# Patient Record
Sex: FEMALE | Race: WHITE | NOT HISPANIC OR LATINO | URBAN - NONMETROPOLITAN AREA
[De-identification: names, ages, dates, MRNs, and addresses within clinical notes are randomized per-mention and may not be internally consistent; named-entity substitution may affect disease eponyms.]

---

## 2018-04-18 ENCOUNTER — APPOINTMENT (RX ONLY)
Dept: URBAN - NONMETROPOLITAN AREA CLINIC 13 | Facility: CLINIC | Age: 53
Setting detail: DERMATOLOGY
End: 2018-04-18

## 2018-04-18 DIAGNOSIS — Z41.9 ENCOUNTER FOR PROCEDURE FOR PURPOSES OTHER THAN REMEDYING HEALTH STATE, UNSPECIFIED: ICD-10-CM

## 2018-04-18 PROCEDURE — ? WAXING

## 2018-06-28 ENCOUNTER — APPOINTMENT (RX ONLY)
Dept: URBAN - NONMETROPOLITAN AREA CLINIC 13 | Facility: CLINIC | Age: 53
Setting detail: DERMATOLOGY
End: 2018-06-28

## 2018-06-28 DIAGNOSIS — Z41.9 ENCOUNTER FOR PROCEDURE FOR PURPOSES OTHER THAN REMEDYING HEALTH STATE, UNSPECIFIED: ICD-10-CM

## 2018-06-28 PROCEDURE — ? BOTOX

## 2018-06-28 PROCEDURE — ? JUVEDERM ULTRA INJECTION

## 2018-06-28 NOTE — PROCEDURE: BOTOX
Lateral Platysmal Bands Units: 0
Lot #: [id number]
Additional Area 1 Location: Total units
Additional Area 4 Location: professional sample
Depressor Anguli Oris Units: 2
Forehead Units: 5
Additional Area 6 Location: professional samples
Additional Area 3 Location: chin
Consent: Written consent obtained. Risks include but not limited to lid/brow ptosis, bruising, swelling, diplopia, temporary effect, incomplete chemical denervation.
Glabellar Complex Units: 20
Detail Level: Detailed
Expiration Date (Month Year): 12/20
Dilution (U/0.1 Cc): 1
Additional Area 2 Location: [facility]
Price (Use Numbers Only, No Special Characters Or $): 0.00
Post-Care Instructions: Patient instructed to limit physical activity for 24 hours. Patient instructed not to rub or massage areas injected for 24 hours.

## 2018-06-28 NOTE — PROCEDURE: JUVEDERM ULTRA INJECTION
Marionette Lines Filler Volume In Cc: 0.3
Additional Area 5 Volume In Cc: 0
Topical Anesthesia?: BLT gel (benzocaine 20%, lidocaine 6%, tetracaine 4%)
Map Statement: See Attach Map for Complete Details
Lot #: [id number]
Detail Level: Detailed
Anesthesia Volume In Cc: 0.5
Vermilion Lips Filler Volume In Cc: 0.7
Use Map Statement For Sites (Optional): No
Filler: Juvederm Ultra
Anesthesia Type: 1% lidocaine with epinephrine
Post-Care Instructions: Patient instructed to apply ice to reduce swelling.
Consent: Written consent obtained. Risks include but not limited to bruising, beading, irregular texture, ulceration, infection, allergic reaction, scar formation, incomplete augmentation, temporary nature, procedural pain.
Expiration Date (Month Year): 2/19
Procedural Text: The filler was administered to the treatment areas noted above.
Additional Anesthesia Volume In Cc: 6
Price (Use Numbers Only, No Special Characters Or $): 161

## 2018-08-02 ENCOUNTER — APPOINTMENT (RX ONLY)
Dept: URBAN - NONMETROPOLITAN AREA CLINIC 13 | Facility: CLINIC | Age: 53
Setting detail: DERMATOLOGY
End: 2018-08-02

## 2018-08-02 DIAGNOSIS — Z41.9 ENCOUNTER FOR PROCEDURE FOR PURPOSES OTHER THAN REMEDYING HEALTH STATE, UNSPECIFIED: ICD-10-CM

## 2018-08-02 PROCEDURE — ? ADDITIONAL NOTES

## 2018-08-02 PROCEDURE — ? BOTOX

## 2018-08-02 ASSESSMENT — LOCATION SIMPLE DESCRIPTION DERM
LOCATION SIMPLE: RIGHT NOSE
LOCATION SIMPLE: LEFT CHEEK

## 2018-08-02 ASSESSMENT — LOCATION DETAILED DESCRIPTION DERM
LOCATION DETAILED: LEFT SUPERIOR NASAL CHEEK
LOCATION DETAILED: RIGHT NASAL SIDEWALL

## 2018-08-02 ASSESSMENT — LOCATION ZONE DERM
LOCATION ZONE: NOSE
LOCATION ZONE: FACE

## 2018-08-02 NOTE — PROCEDURE: BOTOX
Lot #: [id number]
Additional Area 6 Units: 0
Additional Area 2 Location: [facility]
Additional Area 1 Location: Total units
Additional Area 4 Location: professional sample
Post-Care Instructions: Patient instructed to limit physical activity for 24 hours. Patient instructed not to rub or massage areas injected for 24 hours.
Dilution (U/0.1 Cc): 1
Consent: Written consent obtained. Risks include but not limited to lid/brow ptosis, bruising, swelling, diplopia, temporary effect, incomplete chemical denervation.
Expiration Date (Month Year): 12/20
Additional Area 6 Location: professional samples
Detail Level: Detailed
Additional Area 4 Units: 2
Price (Use Numbers Only, No Special Characters Or $): 0.00
Additional Area 3 Location: chin

## 2018-08-15 ENCOUNTER — APPOINTMENT (RX ONLY)
Dept: URBAN - NONMETROPOLITAN AREA CLINIC 13 | Facility: CLINIC | Age: 53
Setting detail: DERMATOLOGY
End: 2018-08-15

## 2018-08-15 DIAGNOSIS — Z41.9 ENCOUNTER FOR PROCEDURE FOR PURPOSES OTHER THAN REMEDYING HEALTH STATE, UNSPECIFIED: ICD-10-CM

## 2018-08-15 PROCEDURE — ? WAXING

## 2018-08-15 NOTE — PROCEDURE: WAXING
Detail Level: Zone
Post-Care Instructions: I reviewed with the patient in detail post-care instructions. Patient should avoid sun exposure and wear sun protection.

## 2018-10-19 ENCOUNTER — APPOINTMENT (RX ONLY)
Dept: URBAN - NONMETROPOLITAN AREA CLINIC 13 | Facility: CLINIC | Age: 53
Setting detail: DERMATOLOGY
End: 2018-10-19

## 2018-10-19 DIAGNOSIS — Z41.9 ENCOUNTER FOR PROCEDURE FOR PURPOSES OTHER THAN REMEDYING HEALTH STATE, UNSPECIFIED: ICD-10-CM

## 2018-10-19 PROCEDURE — ? WAXING

## 2019-01-12 ENCOUNTER — APPOINTMENT (RX ONLY)
Dept: URBAN - NONMETROPOLITAN AREA CLINIC 13 | Facility: CLINIC | Age: 54
Setting detail: DERMATOLOGY
End: 2019-01-12

## 2019-01-12 DIAGNOSIS — Z41.9 ENCOUNTER FOR PROCEDURE FOR PURPOSES OTHER THAN REMEDYING HEALTH STATE, UNSPECIFIED: ICD-10-CM

## 2019-01-12 PROCEDURE — ? WAXING

## 2019-01-12 NOTE — PROCEDURE: WAXING
Price (Use Numbers Only, No Special Characters Or $): 0.00
Post-Care Instructions: I reviewed with the patient in detail post-care instructions. Patient should avoid sun exposure and wear sun protection.
Detail Level: Zone

## 2019-04-17 ENCOUNTER — APPOINTMENT (RX ONLY)
Dept: URBAN - NONMETROPOLITAN AREA CLINIC 13 | Facility: CLINIC | Age: 54
Setting detail: DERMATOLOGY
End: 2019-04-17

## 2019-04-17 DIAGNOSIS — Z41.9 ENCOUNTER FOR PROCEDURE FOR PURPOSES OTHER THAN REMEDYING HEALTH STATE, UNSPECIFIED: ICD-10-CM

## 2019-04-17 PROCEDURE — ? RESTYLANE REFYNE INJECTION

## 2019-04-17 PROCEDURE — ? BOTOX

## 2019-04-17 NOTE — PROCEDURE: RESTYLANE REFYNE INJECTION
Additional Area 2 Location: perioral
Price (Use Numbers Only, No Special Characters Or $): 649
Include Cannula Information In Note?: No
Additional Anesthesia Volume In Cc: 6
Brows Filler Volume In Cc: 0
Marionette Lines Filler Volume In Cc: 1
Map Statement: See Attach Map for Complete Details
Post-Care Instructions: Patient instructed to apply ice to reduce swelling.
Anesthesia Type: 1% lidocaine with epinephrine
Anesthesia Volume In Cc: 0.5
Additional Area 5 Location: chin
Detail Level: Detailed
Expiration Date (Month Year): 5/20
Additional Area 3 Location: corner of mouth
Additional Area 1 Location: lower face rhytides
Filler: Restylane Refyne
Procedural Text: The filler was administered to the treatment areas noted above.
Lot #: [id number]
Consent: Written consent obtained. Risks include but not limited to bruising, beading, irregular texture, ulceration, infection, allergic reaction, scar formation, incomplete augmentation, temporary nature, procedural pain.

## 2019-04-17 NOTE — PROCEDURE: BOTOX
Detail Level: Zone
Masseter Units: 0
Expiration Date (Month Year): 9/21
Price (Use Numbers Only, No Special Characters Or $): 275
Glabellar Complex Units: 25
Additional Area 3 Location: bunny lines
Additional Area 6 Location: chin
Additional Area 4 Location: brow
Additional Area 1 Location: [place], L
Forehead Units: 3
Lot #: 
Dilution (U/0.1 Cc): 1
Additional Area 5 Location: lip upper
Additional Area 5 Units: 4
Consent: Written consent obtained. Risks include but not limited to lid/brow ptosis, bruising, swelling, diplopia, temporary effect, incomplete chemical denervation.
Post-Care Instructions: Patient instructed to not lie down for 4 hours and limit physical activity for 24 hours. Patient instructed not to travel by airplane for 48 hours.
Additional Area 2 Location: breanne's,

## 2019-04-18 ENCOUNTER — APPOINTMENT (RX ONLY)
Dept: URBAN - NONMETROPOLITAN AREA CLINIC 13 | Facility: CLINIC | Age: 54
Setting detail: DERMATOLOGY
End: 2019-04-18

## 2019-04-18 DIAGNOSIS — Z41.9 ENCOUNTER FOR PROCEDURE FOR PURPOSES OTHER THAN REMEDYING HEALTH STATE, UNSPECIFIED: ICD-10-CM

## 2019-04-18 PROCEDURE — ? ADDITIONAL NOTES

## 2019-04-19 ENCOUNTER — APPOINTMENT (RX ONLY)
Dept: URBAN - NONMETROPOLITAN AREA CLINIC 13 | Facility: CLINIC | Age: 54
Setting detail: DERMATOLOGY
End: 2019-04-19

## 2019-04-19 DIAGNOSIS — Z41.9 ENCOUNTER FOR PROCEDURE FOR PURPOSES OTHER THAN REMEDYING HEALTH STATE, UNSPECIFIED: ICD-10-CM

## 2019-04-19 PROCEDURE — ? WAXING

## 2019-10-17 ENCOUNTER — APPOINTMENT (RX ONLY)
Dept: URBAN - NONMETROPOLITAN AREA CLINIC 13 | Facility: CLINIC | Age: 54
Setting detail: DERMATOLOGY
End: 2019-10-17

## 2019-10-17 DIAGNOSIS — Z41.9 ENCOUNTER FOR PROCEDURE FOR PURPOSES OTHER THAN REMEDYING HEALTH STATE, UNSPECIFIED: ICD-10-CM

## 2019-10-17 PROCEDURE — ? WAXING

## 2019-10-17 NOTE — PROCEDURE: WAXING
Detail Level: Zone
Price (Use Numbers Only, No Special Characters Or $): 0.00
Post-Care Instructions: I reviewed with the patient in detail post-care instructions. Patient should avoid sun exposure and wear sun protection.

## 2020-05-19 ENCOUNTER — APPOINTMENT (RX ONLY)
Dept: URBAN - NONMETROPOLITAN AREA CLINIC 13 | Facility: CLINIC | Age: 55
Setting detail: DERMATOLOGY
End: 2020-05-19

## 2020-05-19 DIAGNOSIS — Z41.9 ENCOUNTER FOR PROCEDURE FOR PURPOSES OTHER THAN REMEDYING HEALTH STATE, UNSPECIFIED: ICD-10-CM

## 2020-05-19 PROCEDURE — ? JUVEDERM VOLBELLA INJECTION

## 2020-05-19 PROCEDURE — ? BOTOX

## 2020-05-19 NOTE — PROCEDURE: JUVEDERM VOLBELLA INJECTION
Dorsal Hands Filler Volume In Cc: 0
Expiration Date (Month Year): 5/21
Vermilion Lips Filler Volume In Cc: 0.5
Post-Care Instructions: Patient instructed to apply ice to reduce swelling.
Number Of Syringes (Required For Inventory): 1
Include Cannula Information In Note?: No
Additional Area 1 Location: madelyn oral rhytides
Map Statment: See Attach Map for Complete Details
Detail Level: Detailed
Additional Area 4 Location: Vermillion border, lower lip and perioral rhytides
Filler: Juvederm Volbella XC
Lot #: [id number]
Additional Anesthesia Volume In Cc: 6
Consent: Written consent obtained. Risks include but not limited to bruising, beading, irregular texture, ulceration, infection, allergic reaction, scar formation, incomplete augmentation, temporary nature, procedural pain.
Additional Area 3 Location: corners of mouth
Additional Area 1 Volume In Cc: 0.2
Procedural Text: The filler was administered to the treatment areas noted above.
Additional Area 5 Location: touch up to lip filler
Price (Use Numbers Only, No Special Characters Or $): 333
Additional Area 3 Volume In Cc: 0.3
Anesthesia Type: 1% lidocaine with epinephrine
Additional Area 2 Location: Scar on L side of face

## 2020-05-19 NOTE — PROCEDURE: BOTOX
[facility] Units: 0
Expiration Date (Month Year): 10/22
Additional Area 2 Location: chin
Additional Area 6 Location: lip upper
Show Depressor Anguli Units: Yes
Glabellar Complex Units: 25
Consent: Written consent obtained. Risks include but not limited to lid/brow ptosis, bruising, swelling, diplopia, temporary effect, incomplete chemical denervation.
Additional Area 1 Location: [facility].
Show Right And Left Periorbital Units: No
Additional Area 5 Location: saba lines
Dilution (U/0.1 Cc): 1
Additional Area 4 Location: brow
Forehead Units: 3
Price (Use Numbers Only, No Special Characters Or $): 308
Post-Care Instructions: Patient instructed to not lie down for 4 hours and limit physical activity for 24 hours. Patient instructed not to travel by airplane for 48 hours.
Additional Area 3 Location: R high forehead
Lot #: 
Detail Level: Zone

## 2020-06-12 ENCOUNTER — APPOINTMENT (RX ONLY)
Dept: URBAN - NONMETROPOLITAN AREA CLINIC 13 | Facility: CLINIC | Age: 55
Setting detail: DERMATOLOGY
End: 2020-06-12

## 2020-06-12 DIAGNOSIS — Z41.9 ENCOUNTER FOR PROCEDURE FOR PURPOSES OTHER THAN REMEDYING HEALTH STATE, UNSPECIFIED: ICD-10-CM

## 2020-06-12 PROCEDURE — ? WAXING

## 2020-07-15 ENCOUNTER — APPOINTMENT (RX ONLY)
Dept: URBAN - NONMETROPOLITAN AREA CLINIC 13 | Facility: CLINIC | Age: 55
Setting detail: DERMATOLOGY
End: 2020-07-15

## 2020-07-15 DIAGNOSIS — Z41.9 ENCOUNTER FOR PROCEDURE FOR PURPOSES OTHER THAN REMEDYING HEALTH STATE, UNSPECIFIED: ICD-10-CM

## 2020-07-15 PROCEDURE — ? BOTOX

## 2020-07-15 PROCEDURE — ? ADDITIONAL NOTES

## 2020-07-15 NOTE — PROCEDURE: ADDITIONAL NOTES
Detail Level: Simple
Additional Notes: 0.5 cc Juvederm ultra injected into corner of mouth, ps used.

## 2020-07-15 NOTE — PROCEDURE: BOTOX
Additional Area 6 Location: lip upper
Mentalis Units: 0
Show Nasal Units: Yes
Post-Care Instructions: Patient instructed to not lie down for 4 hours and limit physical activity for 24 hours. Patient instructed not to travel by airplane for 48 hours.
Additional Area 3 Location: R side forehead
Dilution (U/0.1 Cc): 1
Show Right And Left Periorbital Units: No
Additional Area 5 Location: saba lines
Price (Use Numbers Only, No Special Characters Or $): 132
Additional Area 2 Location: chin
Depressor Anguli Oris Units: 8
Additional Area 6 Units: 4
Consent: Written consent obtained. Risks include but not limited to lid/brow ptosis, bruising, swelling, diplopia, temporary effect, incomplete chemical denervation.
Lot #: 
Additional Area 4 Location: brow
Expiration Date (Month Year): 2/23
Detail Level: Zone

## 2020-10-16 ENCOUNTER — APPOINTMENT (RX ONLY)
Dept: URBAN - NONMETROPOLITAN AREA CLINIC 13 | Facility: CLINIC | Age: 55
Setting detail: DERMATOLOGY
End: 2020-10-16

## 2020-10-16 DIAGNOSIS — Z41.9 ENCOUNTER FOR PROCEDURE FOR PURPOSES OTHER THAN REMEDYING HEALTH STATE, UNSPECIFIED: ICD-10-CM

## 2020-10-16 PROCEDURE — ? WAXING

## 2021-04-15 ENCOUNTER — APPOINTMENT (RX ONLY)
Dept: URBAN - NONMETROPOLITAN AREA CLINIC 13 | Facility: CLINIC | Age: 56
Setting detail: DERMATOLOGY
End: 2021-04-15

## 2021-04-15 DIAGNOSIS — Z41.9 ENCOUNTER FOR PROCEDURE FOR PURPOSES OTHER THAN REMEDYING HEALTH STATE, UNSPECIFIED: ICD-10-CM

## 2021-04-15 PROCEDURE — ? BOTOX

## 2021-04-15 PROCEDURE — ? ADDITIONAL NOTES

## 2021-04-15 NOTE — PROCEDURE: BOTOX
Show Nasal Units: Yes
Lot #: 
Expiration Date (Month Year): 7/23
Show Right And Left Periorbital Units: No
Lateral Platysmal Bands Units: 0
Additional Area 4 Location: under eyes
Additional Area 2 Location: upper lip
Additional Area 1 Location: Brow
Additional Area 2 Units: 3
Post-Care Instructions: Patient instructed to not lie down for 4 hours and limit physical activity for 24 hours. Patient instructed not to travel by airplane for 48 hours.
Detail Level: Zone
Additional Area 6 Location: chin
Dilution (U/0.1 Cc): 1
Price (Use Numbers Only, No Special Characters Or $): 538
Glabellar Complex Units: 25
Additional Area 3 Location: Right lateral eye
Consent: Written consent obtained. Risks include but not limited to lid/brow ptosis, bruising, swelling, diplopia, temporary effect, incomplete chemical denervation.
Additional Area 5 Location: forhead R side 1 unit.

## 2021-04-15 NOTE — PROCEDURE: ADDITIONAL NOTES
Detail Level: Simple
Additional Notes: Rescheduled for filler due to [place]. She wants lips, periral rhytides and marionette lines done. I told her we definitely need to vials, suggested Volbella and Refyne.
Render Risk Assessment In Note?: no

## 2021-04-21 ENCOUNTER — APPOINTMENT (RX ONLY)
Dept: URBAN - NONMETROPOLITAN AREA CLINIC 13 | Facility: CLINIC | Age: 56
Setting detail: DERMATOLOGY
End: 2021-04-21

## 2021-04-21 DIAGNOSIS — Z41.9 ENCOUNTER FOR PROCEDURE FOR PURPOSES OTHER THAN REMEDYING HEALTH STATE, UNSPECIFIED: ICD-10-CM

## 2021-04-21 PROCEDURE — ? WAXING

## 2021-04-23 ENCOUNTER — APPOINTMENT (RX ONLY)
Dept: URBAN - NONMETROPOLITAN AREA CLINIC 13 | Facility: CLINIC | Age: 56
Setting detail: DERMATOLOGY
End: 2021-04-23

## 2021-04-23 DIAGNOSIS — Z41.9 ENCOUNTER FOR PROCEDURE FOR PURPOSES OTHER THAN REMEDYING HEALTH STATE, UNSPECIFIED: ICD-10-CM

## 2021-04-23 PROCEDURE — ? BOTOX

## 2021-04-23 PROCEDURE — ? JUVEDERM VOLBELLA INJECTION

## 2021-04-23 PROCEDURE — ? RESTYLANE REFYNE INJECTION

## 2021-04-23 NOTE — PROCEDURE: JUVEDERM VOLBELLA INJECTION
Lot #: [id number]
Tear Troughs Filler Volume In Cc: 0
Additional Area 4 Location: perioral rhytides
Consent: Written consent obtained. Risks include but not limited to bruising, beading, irregular texture, ulceration, infection, allergic reaction, scar formation, incomplete augmentation, temporary nature, procedural pain.
Include Cannula Information In Note?: No
Filler: Juvederm Volbella XC
Anesthesia Type: 1% lidocaine with epinephrine
Procedural Text: The filler was administered to the treatment areas noted above.
Additional Area 2 Location: glabella
Additional Area 5 Location: lower lip
Price (Use Numbers Only, No Special Characters Or $): 892
Expiration Date (Month Year): 6/22
Map Statment: See Attach Map for Complete Details
Detail Level: Detailed
Anesthesia Volume In Cc: 0.5
Number Of Syringes (Required For Inventory): 1
Post-Care Instructions: Patient instructed to apply ice to reduce swelling.
Additional Area 3 Location: corners of mouth,
Additional Area 1 Location: upper lip left side
Additional Anesthesia Volume In Cc: 6

## 2021-04-23 NOTE — PROCEDURE: RESTYLANE REFYNE INJECTION
Additional Area 4 Volume In Cc: 0
Filler: Restylane Refyne
Include Cannula Information In Note?: No
Consent: Written consent obtained. Risks include but not limited to bruising, beading, irregular texture, ulceration, infection, allergic reaction, scar formation, incomplete augmentation, temporary nature, procedural pain.
Anesthesia Type: 1% lidocaine with epinephrine
Additional Area 4 Location: ermillion and perioral rhytides
Additional Area 2 Location: perioral
Additional Area 1 Location: lower face rhytides
Additional Anesthesia Volume In Cc: 6
Price (Use Numbers Only, No Special Characters Or $): 572
Expiration Date (Month Year): 04/22
Additional Area 5 Location: chin,
Post-Care Instructions: Patient instructed to apply ice to reduce swelling.
Marionette Lines Filler Volume In Cc: 1
Anesthesia Volume In Cc: 0.5
Map Statement: See Attach Map for Complete Details
Additional Area 3 Location: corner of mouth
Procedural Text: The filler was administered to the treatment areas noted above.
Lot #: [id number]
Detail Level: Detailed

## 2021-04-23 NOTE — PROCEDURE: BOTOX
Inferior Lateral Orbicularis Oculi Units: 0
Expiration Date (Month Year): 6/23
Lot #: 
Show Additional Area 2: Yes
Show Right And Left Periorbital Units: No
Additional Area 5 Location: forhead R side 1 unit.
Detail Level: Zone
Depressor Anguli Oris Units: 5
Additional Area 2 Location: upper lip
Post-Care Instructions: Patient instructed to not lie down for 4 hours and limit physical activity for 24 hours. Patient instructed not to travel by airplane for 48 hours.
Additional Area 6 Location: chin
Dilution (U/0.1 Cc): 1
Additional Area 4 Location: under eyes
Consent: Written consent obtained. Risks include but not limited to lid/brow ptosis, bruising, swelling, diplopia, temporary effect, incomplete chemical denervation.
Additional Area 3 Location: Right lateral eye
Additional Area 1 Location: Brow

## 2021-08-18 ENCOUNTER — APPOINTMENT (RX ONLY)
Dept: URBAN - NONMETROPOLITAN AREA CLINIC 13 | Facility: CLINIC | Age: 56
Setting detail: DERMATOLOGY
End: 2021-08-18

## 2021-08-18 DIAGNOSIS — Z41.9 ENCOUNTER FOR PROCEDURE FOR PURPOSES OTHER THAN REMEDYING HEALTH STATE, UNSPECIFIED: ICD-10-CM

## 2021-08-18 PROCEDURE — ? WAXING

## 2022-10-20 ENCOUNTER — OFFICE VISIT (OUTPATIENT)
Dept: ENDOCRINOLOGY | Facility: CLINIC | Age: 57
End: 2022-10-20

## 2022-10-20 VITALS
HEIGHT: 68 IN | BODY MASS INDEX: 27.89 KG/M2 | WEIGHT: 184 LBS | DIASTOLIC BLOOD PRESSURE: 60 MMHG | HEART RATE: 59 BPM | SYSTOLIC BLOOD PRESSURE: 108 MMHG | OXYGEN SATURATION: 90 %

## 2022-10-20 DIAGNOSIS — G62.9 NEUROPATHY: ICD-10-CM

## 2022-10-20 DIAGNOSIS — Z46.81 INSULIN PUMP TITRATION: ICD-10-CM

## 2022-10-20 DIAGNOSIS — E10.65 TYPE 1 DIABETES MELLITUS WITH HYPERGLYCEMIA: Primary | ICD-10-CM

## 2022-10-20 PROBLEM — G25.2 DYSTONIC TREMOR: Status: ACTIVE | Noted: 2022-08-19

## 2022-10-20 PROBLEM — G24.3 CERVICAL DYSTONIA: Status: ACTIVE | Noted: 2022-10-20

## 2022-10-20 LAB
EXPIRATION DATE: ABNORMAL
EXPIRATION DATE: NORMAL
GLUCOSE BLDC GLUCOMTR-MCNC: 182 MG/DL (ref 70–130)
HBA1C MFR BLD: 6.6 %
Lab: ABNORMAL
Lab: NORMAL

## 2022-10-20 PROCEDURE — 99204 OFFICE O/P NEW MOD 45 MIN: CPT | Performed by: INTERNAL MEDICINE

## 2022-10-20 PROCEDURE — 95251 CONT GLUC MNTR ANALYSIS I&R: CPT | Performed by: INTERNAL MEDICINE

## 2022-10-20 PROCEDURE — 83036 HEMOGLOBIN GLYCOSYLATED A1C: CPT | Performed by: INTERNAL MEDICINE

## 2022-10-20 PROCEDURE — 3044F HG A1C LEVEL LT 7.0%: CPT | Performed by: INTERNAL MEDICINE

## 2022-10-20 RX ORDER — ONDANSETRON 4 MG/1
4 TABLET, FILM COATED ORAL EVERY 8 HOURS PRN
COMMUNITY

## 2022-10-20 RX ORDER — PROCHLORPERAZINE 25 MG/1
SUPPOSITORY RECTAL
COMMUNITY
Start: 2022-08-26 | End: 2022-12-14 | Stop reason: SDUPTHER

## 2022-10-20 RX ORDER — HYDROCHLOROTHIAZIDE 25 MG/1
TABLET ORAL
COMMUNITY
Start: 2022-08-17

## 2022-10-20 RX ORDER — GABAPENTIN 600 MG/1
600 TABLET ORAL NIGHTLY
Start: 2022-10-20 | End: 2022-12-14 | Stop reason: SDUPTHER

## 2022-10-20 RX ORDER — INSULIN ASPART 100 [IU]/ML
INJECTION, SOLUTION INTRAVENOUS; SUBCUTANEOUS
COMMUNITY
Start: 2022-10-05 | End: 2023-03-27 | Stop reason: SDUPTHER

## 2022-10-20 RX ORDER — NAPROXEN 375 MG/1
375 TABLET ORAL 2 TIMES DAILY PRN
COMMUNITY

## 2022-10-20 RX ORDER — GABAPENTIN 600 MG/1
TABLET ORAL
COMMUNITY
Start: 2022-10-12 | End: 2022-10-20 | Stop reason: SDUPTHER

## 2022-10-20 RX ORDER — PROCHLORPERAZINE 25 MG/1
SUPPOSITORY RECTAL
COMMUNITY
Start: 2022-09-18 | End: 2022-12-27 | Stop reason: SDUPTHER

## 2022-10-20 NOTE — PROGRESS NOTES
Chief Complaint   Patient presents with   • Diabetes     Type I        Referring Provider  Dr. Karly ESPINOZA   Roseann Duncan is a 57 y.o. female had concerns including Diabetes (Type I).    Diabetes was diagnosed age 10.  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 Is on gabapentin 600 mg at night.   Last ophtho exam was within a few months. No retinopathy. Was seen in [place] with John Bravo.   Current medications for diabetes include insulin pump. Has been on current pump for about a year.   She checks her blood sugar 4+ times per day with CGM.  Data reviewed from 10/6/2022 through 10/19/2022 shows an average glucose of 146, lowest 40, highest 400.  She is in target range 74% of the time, above target 22%, below target 5%.  No postprandial hyperglycemia.  She does not enter carbs, has not had to recently.  Eats a fairly low carb diet.  Use of control IQ 94% of the time, total daily insulin 36.56 units, basal 65%, 0% food bolus, control IQ bolus 28%.      Past Medical History:   Diagnosis Date   • Diabetes mellitus type I (HCC)    • Dystonic tremor    • Neuropathy      Past Surgical History:   Procedure Laterality Date   • ACHILLES TENDON LENGTHENING Bilateral    • BREAST AUGMENTATION BILATERAL MASTOPEXY Bilateral    •  SECTION N/A    • FOOT TENDON SURGERY Bilateral    • TOE AMPUTATION Right    • WISDOM TOOTH EXTRACTION N/A       Family History   Problem Relation Age of Onset   • No Known Problems Mother    • Hip dysplasia Father    • Cancer Sister         Breast      Social History     Socioeconomic History   • Marital status:    Tobacco Use   • Smoking status: Never   • Smokeless tobacco: Never   Vaping Use   • Vaping Use: Never used   Substance and Sexual Activity   • Alcohol use: Yes     Comment: social   • Drug use: Defer   • Sexual activity: Defer      Allergies   Allergen Reactions   • Cipro [Ciprofloxacin Hcl] Rash   •  "Clindamycin/Lincomycin Rash      Current Outpatient Medications on File Prior to Visit   Medication Sig Dispense Refill   • Continuous Blood Gluc Sensor (Dexcom G6 Sensor)      • Continuous Blood Gluc Transmit (Dexcom G6 Transmitter) misc      • hydroCHLOROthiazide (HYDRODIURIL) 25 MG tablet      • naproxen (NAPROSYN) 375 MG tablet Take 1 tablet by mouth 2 (Two) Times a Day As Needed for Mild Pain. PRN     • NovoLOG 100 UNIT/ML injection With Tslim insulin pump  MDD 60 units     • ondansetron (ZOFRAN) 4 MG tablet Take 1 tablet by mouth Every 8 (Eight) Hours As Needed for Nausea or Vomiting.     • [DISCONTINUED] gabapentin (NEURONTIN) 600 MG tablet        No current facility-administered medications on file prior to visit.        Review of Systems   Constitutional: Negative.    HENT: Positive for hearing loss and tinnitus.    Respiratory: Negative.    Cardiovascular: Negative.    Gastrointestinal: Negative.    Endocrine:        See HPI   Genitourinary: Negative.    Musculoskeletal: Positive for arthralgias and neck pain.   Skin: Negative.    Neurological: Positive for tremors and numbness.   Hematological: Negative.    Psychiatric/Behavioral: Negative.         /60 (BP Location: Left arm, Patient Position: Sitting, Cuff Size: Adult)   Pulse 59   Ht 172.7 cm (68\")   Wt 83.5 kg (184 lb)   SpO2 90%   BMI 27.98 kg/m²      Physical Exam  Cardiovascular:      Pulses:           Dorsalis pedis pulses are 2+ on the right side and 2+ on the left side.   Feet:      Right foot:      Skin integrity: Skin integrity normal.      Toenail Condition: Right toenails are normal.      Left foot:      Skin integrity: Skin integrity normal.      Toenail Condition: Left toenails are normal.      Comments: Monofilament 2-3/5 bilaterally, diminished diffusely.   Right first toe amputated  Diabetic Foot Exam Performed and Monofilament Test Performed          Constitutional:  well developed; well nourished  no acute distress "   ENT/Thyroid: no thyromegaly  no palpable nodules   Eyes: EOM intact  Conjunctiva: clear   Respiratory:  breathing is unlabored  clear to auscultation bilaterally   Cardiovascular:  regular rate and rhythm, S1, S2 normal, no murmur, click, rub or gallop   Chest:  Not performed.   Abdomen: Not performed.   : Not performed.   Musculoskeletal: negative findings:  ROM of all joints is normal, no deformities present   Skin: dry and warm   Neuro: normal without focal findings and mental status, speech normal, alert and oriented x3   Psych: oriented to time, place and person, mood and affect are within normal limits     LABS AND IMAGING  HbA1c:  Lab Results   Component Value Date    HGBA1C 6.6 10/20/2022     Glucose:  Lab Results   Component Value Date    POCGLU 182 (A) 10/20/2022       Assessment and Plan    Diagnoses and all orders for this visit:    1. Type 1 diabetes mellitus with hyperglycemia (HCC) (Primary)  Mostly controlled with A1c 6.6 but with occasional hyperglycemia.  Sometimes hypoglycemia.  Complicated by neuropathy status post amputation of first toe on the right foot in 2010.  Changes made to the pump as below.  Recommended she start utilizing bolus wizard to enter carbs prior to meals for more consistent BG control.  Labs have been updated at PCPs office and will be requested.  Monofilament updated today.  Ophtho exam is up-to-date.  -     POC Glucose, Blood  -     POC Glycosylated Hemoglobin (Hb A1C)  -     gabapentin (NEURONTIN) 600 MG tablet; Take 1 tablet by mouth Every Night.    2. Insulin pump titration  Decreased all basal rates by 0.1 unit/h to decrease total daily basal insulin to 23.45 units daily.    3. Neuropathy  CSA signed.  Is currently on gabapentin 600 mg nightly.  When due for next refill, our office will send.       Return in about 3 months (around 1/20/2023) for next scheduled follow up. The patient was instructed to contact the clinic with any interval questions or  concerns.    Waleska Albrecht, DO   Endocrinologist    Please note that portions of this note were completed with a voice recognition program.

## 2022-12-14 DIAGNOSIS — E10.65 TYPE 1 DIABETES MELLITUS WITH HYPERGLYCEMIA: ICD-10-CM

## 2022-12-14 RX ORDER — PROCHLORPERAZINE 25 MG/1
1 SUPPOSITORY RECTAL
Qty: 1 EACH | Refills: 3 | Status: SHIPPED | OUTPATIENT
Start: 2022-12-14

## 2022-12-14 RX ORDER — GABAPENTIN 600 MG/1
600 TABLET ORAL NIGHTLY
Qty: 90 TABLET | Refills: 1 | Status: SHIPPED | OUTPATIENT
Start: 2022-12-14

## 2022-12-14 NOTE — TELEPHONE ENCOUNTER
Pt called. She states she needs refill on Gabapentin as well as her Dex Com transmitter sent to [facility] in the hospital in [place].    Please advise.

## 2022-12-27 RX ORDER — PROCHLORPERAZINE 25 MG/1
SUPPOSITORY RECTAL
Qty: 9 EACH | Refills: 1 | Status: SHIPPED | OUTPATIENT
Start: 2022-12-27

## 2023-03-27 RX ORDER — INSULIN ASPART 100 [IU]/ML
INJECTION, SOLUTION INTRAVENOUS; SUBCUTANEOUS
Qty: 20 ML | Refills: 0 | Status: SHIPPED | OUTPATIENT
Start: 2023-03-27 | End: 2023-04-04 | Stop reason: SDUPTHER

## 2023-03-27 NOTE — TELEPHONE ENCOUNTER
PT CALLED REQUESTING NOVOLOG VIALS TO BE SENT IN TO [facility] IN [place], [place].

## 2023-04-04 ENCOUNTER — OFFICE VISIT (OUTPATIENT)
Dept: ENDOCRINOLOGY | Facility: CLINIC | Age: 58
End: 2023-04-04
Payer: COMMERCIAL

## 2023-04-04 VITALS
HEART RATE: 65 BPM | OXYGEN SATURATION: 97 % | WEIGHT: 188 LBS | HEIGHT: 68 IN | SYSTOLIC BLOOD PRESSURE: 110 MMHG | DIASTOLIC BLOOD PRESSURE: 60 MMHG | BODY MASS INDEX: 28.49 KG/M2

## 2023-04-04 DIAGNOSIS — E10.65 TYPE 1 DIABETES MELLITUS WITH HYPERGLYCEMIA: Primary | ICD-10-CM

## 2023-04-04 PROBLEM — G25.82 STIFF PERSON SYNDROME: Status: ACTIVE | Noted: 2023-04-04

## 2023-04-04 LAB
ALBUMIN SERPL-MCNC: 4.4 G/DL (ref 3.5–5.2)
ALBUMIN UR-MCNC: <1.2 MG/DL
ALBUMIN/GLOB SERPL: 1.5 G/DL
ALP SERPL-CCNC: 94 U/L (ref 39–117)
ALT SERPL W P-5'-P-CCNC: 18 U/L (ref 1–33)
ANION GAP SERPL CALCULATED.3IONS-SCNC: 7.5 MMOL/L (ref 5–15)
AST SERPL-CCNC: 25 U/L (ref 1–32)
BILIRUB SERPL-MCNC: 0.3 MG/DL (ref 0–1.2)
BUN SERPL-MCNC: 14 MG/DL (ref 6–20)
BUN/CREAT SERPL: 18.9 (ref 7–25)
CALCIUM SPEC-SCNC: 9.8 MG/DL (ref 8.6–10.5)
CHLORIDE SERPL-SCNC: 106 MMOL/L (ref 98–107)
CHOLEST SERPL-MCNC: 193 MG/DL (ref 0–200)
CO2 SERPL-SCNC: 29.5 MMOL/L (ref 22–29)
CREAT SERPL-MCNC: 0.74 MG/DL (ref 0.57–1)
CREAT UR-MCNC: 96 MG/DL
DEPRECATED RDW RBC AUTO: 39.2 FL (ref 37–54)
EGFRCR SERPLBLD CKD-EPI 2021: 93.3 ML/MIN/1.73
ERYTHROCYTE [DISTWIDTH] IN BLOOD BY AUTOMATED COUNT: 12 % (ref 12.3–15.4)
EXPIRATION DATE: ABNORMAL
EXPIRATION DATE: NORMAL
GLOBULIN UR ELPH-MCNC: 2.9 GM/DL
GLUCOSE BLDC GLUCOMTR-MCNC: 169 MG/DL (ref 70–130)
GLUCOSE SERPL-MCNC: 129 MG/DL (ref 65–99)
HBA1C MFR BLD: 7.1 %
HCT VFR BLD AUTO: 35.1 % (ref 34–46.6)
HDLC SERPL-MCNC: 96 MG/DL (ref 40–60)
HGB BLD-MCNC: 12 G/DL (ref 12–15.9)
LDLC SERPL CALC-MCNC: 85 MG/DL (ref 0–100)
LDLC/HDLC SERPL: 0.88 {RATIO}
Lab: ABNORMAL
Lab: NORMAL
MCH RBC QN AUTO: 30.4 PG (ref 26.6–33)
MCHC RBC AUTO-ENTMCNC: 34.2 G/DL (ref 31.5–35.7)
MCV RBC AUTO: 88.9 FL (ref 79–97)
MICROALBUMIN/CREAT UR: NORMAL MG/G{CREAT}
PLATELET # BLD AUTO: 341 10*3/MM3 (ref 140–450)
PMV BLD AUTO: 9.6 FL (ref 6–12)
POTASSIUM SERPL-SCNC: 3.9 MMOL/L (ref 3.5–5.2)
PROT SERPL-MCNC: 7.3 G/DL (ref 6–8.5)
RBC # BLD AUTO: 3.95 10*6/MM3 (ref 3.77–5.28)
SODIUM SERPL-SCNC: 143 MMOL/L (ref 136–145)
TRIGL SERPL-MCNC: 62 MG/DL (ref 0–150)
VLDLC SERPL-MCNC: 12 MG/DL (ref 5–40)
WBC NRBC COR # BLD: 7.13 10*3/MM3 (ref 3.4–10.8)

## 2023-04-04 PROCEDURE — 82043 UR ALBUMIN QUANTITATIVE: CPT | Performed by: INTERNAL MEDICINE

## 2023-04-04 PROCEDURE — 83036 HEMOGLOBIN GLYCOSYLATED A1C: CPT | Performed by: INTERNAL MEDICINE

## 2023-04-04 PROCEDURE — 82570 ASSAY OF URINE CREATININE: CPT | Performed by: INTERNAL MEDICINE

## 2023-04-04 PROCEDURE — 80061 LIPID PANEL: CPT | Performed by: INTERNAL MEDICINE

## 2023-04-04 PROCEDURE — 95251 CONT GLUC MNTR ANALYSIS I&R: CPT | Performed by: INTERNAL MEDICINE

## 2023-04-04 PROCEDURE — 1160F RVW MEDS BY RX/DR IN RCRD: CPT | Performed by: INTERNAL MEDICINE

## 2023-04-04 PROCEDURE — 80053 COMPREHEN METABOLIC PANEL: CPT | Performed by: INTERNAL MEDICINE

## 2023-04-04 PROCEDURE — 3051F HG A1C>EQUAL 7.0%<8.0%: CPT | Performed by: INTERNAL MEDICINE

## 2023-04-04 PROCEDURE — 85027 COMPLETE CBC AUTOMATED: CPT | Performed by: INTERNAL MEDICINE

## 2023-04-04 PROCEDURE — 1159F MED LIST DOCD IN RCRD: CPT | Performed by: INTERNAL MEDICINE

## 2023-04-04 PROCEDURE — 99213 OFFICE O/P EST LOW 20 MIN: CPT | Performed by: INTERNAL MEDICINE

## 2023-04-04 RX ORDER — INSULIN ASPART 100 [IU]/ML
INJECTION, SOLUTION INTRAVENOUS; SUBCUTANEOUS
Qty: 60 ML | Refills: 1 | Status: SHIPPED | OUTPATIENT
Start: 2023-04-04

## 2023-04-04 NOTE — PROGRESS NOTES
"Chief Complaint   Patient presents with   • Diabetes     Type I          HPI   Roseann Duncan is a 57 y.o. female had concerns including Diabetes (Type I).    She is checking blood sugars 4+ times per day with CGM. Data reviewed from the last two weeks shows average glucose 159, highest , lowest 40. In target range 64%, high 33%, low 4%.   Pattern of: Postprandial hyperglycemia, occasional hypoglycemia after correcting high BG's    From data reviewed from 3/24/2023 through 4/3/2023 shows she is in control IQ 97% of the time, average daily insulin 41.77 units, basal 60% or 25.2 units.    Current medications for diabetes include insulin pump.  She sometimes forgets to bolus with carbs though has quite a low carb diet, appetite is decreased.    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     The following portions of the patient's history were reviewed and updated as appropriate: allergies, current medications, past family history, past medical history, past social history, past surgical history and problem list.      Review of Systems   Constitutional: Positive for activity change.   Endocrine:        See HPI   Musculoskeletal: Positive for arthralgias and gait problem.   Neurological: Positive for tremors.        Physical Exam  Vitals reviewed.   Constitutional:       Appearance: Normal appearance.   Cardiovascular:      Rate and Rhythm: Normal rate.   Pulmonary:      Effort: Pulmonary effort is normal.   Neurological:      General: No focal deficit present.      Mental Status: She is alert. Mental status is at baseline.   Psychiatric:         Mood and Affect: Mood normal.         Behavior: Behavior normal.        /60 (BP Location: Left arm, Patient Position: Sitting, Cuff Size: Adult)   Pulse 65   Ht 172.7 cm (68\")   Wt 85.3 kg (188 lb)   SpO2 97%   BMI 28.59 kg/m²      Labs and imaging    HbA1c:  Lab Results   Component Value Date    HGBA1C 7.1 04/04/2023    HGBA1C " 6.6 10/20/2022     Glucose:    Lab Results   Component Value Date    POCGLU 169 (A) 04/04/2023       Assessment and plan  Diagnoses and all orders for this visit:    1. Type 1 diabetes mellitus with hyperglycemia (Primary)  Mostly controlled with A1c 7.1 though with occasional postprandial hyperglycemia.  Complicated by neuropathy.  No changes were made to the pump today.  She needs to bolus with all carb intake, 15 minutes prior to eating.  This should correct postprandial hyperglycemia as well as hypoglycemia after either manual or control IQ bolus.  Labs are due and will be checked today.  Ophtho exam is pending and patient will have the report sent here.  Monofilament up-to-date from October.  -     POC Glucose, Blood  -     POC Glycosylated Hemoglobin (Hb A1C)  -     Comprehensive Metabolic Panel  -     CBC (No Diff)  -     Lipid Panel  -     Microalbumin / Creatinine Urine Ratio - Urine, Clean Catch  -     NovoLOG 100 UNIT/ML injection; With Tslim insulin pump  MDD 60 units  Dispense: 60 mL; Refill: 1         Return in about 4 months (around 8/4/2023) for next scheduled follow up . The patient was instructed to contact the clinic with any interval questions or concerns.    Waleska Albrecht, DO   Endocrinologist    Please note that portions of this note were completed with a voice recognition program.

## 2023-04-05 DIAGNOSIS — E78.2 MIXED HYPERLIPIDEMIA: Primary | ICD-10-CM

## 2023-04-05 RX ORDER — ATORVASTATIN CALCIUM 10 MG/1
10 TABLET, FILM COATED ORAL DAILY
COMMUNITY
End: 2023-04-05 | Stop reason: SDUPTHER

## 2023-04-05 RX ORDER — ATORVASTATIN CALCIUM 20 MG/1
20 TABLET, FILM COATED ORAL DAILY
Qty: 90 TABLET | Refills: 1 | Status: SHIPPED | OUTPATIENT
Start: 2023-04-05

## 2023-06-02 DIAGNOSIS — E10.65 TYPE 1 DIABETES MELLITUS WITH HYPERGLYCEMIA: ICD-10-CM

## 2023-06-02 RX ORDER — GABAPENTIN 600 MG/1
TABLET ORAL
Qty: 90 TABLET | Refills: 0 | Status: SHIPPED | OUTPATIENT
Start: 2023-06-02

## 2023-09-05 ENCOUNTER — OFFICE VISIT (OUTPATIENT)
Dept: ENDOCRINOLOGY | Facility: CLINIC | Age: 58
End: 2023-09-05
Payer: COMMERCIAL

## 2023-09-05 VITALS
HEIGHT: 68 IN | BODY MASS INDEX: 27.89 KG/M2 | OXYGEN SATURATION: 98 % | WEIGHT: 184 LBS | DIASTOLIC BLOOD PRESSURE: 60 MMHG | SYSTOLIC BLOOD PRESSURE: 108 MMHG | HEART RATE: 63 BPM

## 2023-09-05 DIAGNOSIS — E10.65 TYPE 1 DIABETES MELLITUS WITH HYPERGLYCEMIA: Primary | ICD-10-CM

## 2023-09-05 DIAGNOSIS — E78.2 MIXED HYPERLIPIDEMIA: ICD-10-CM

## 2023-09-05 DIAGNOSIS — Z46.81 INSULIN PUMP TITRATION: ICD-10-CM

## 2023-09-05 LAB
EXPIRATION DATE: ABNORMAL
EXPIRATION DATE: NORMAL
GLUCOSE BLDC GLUCOMTR-MCNC: 278 MG/DL (ref 70–130)
HBA1C MFR BLD: 7.9 %
Lab: ABNORMAL
Lab: NORMAL

## 2023-09-05 PROCEDURE — 80053 COMPREHEN METABOLIC PANEL: CPT | Performed by: INTERNAL MEDICINE

## 2023-09-05 PROCEDURE — 84443 ASSAY THYROID STIM HORMONE: CPT | Performed by: INTERNAL MEDICINE

## 2023-09-05 PROCEDURE — 80061 LIPID PANEL: CPT | Performed by: INTERNAL MEDICINE

## 2023-09-05 RX ORDER — SUBCUTANEOUS INSULIN PUMP
EACH MISCELLANEOUS
COMMUNITY

## 2023-09-05 RX ORDER — GABAPENTIN 600 MG/1
600 TABLET ORAL
Qty: 90 TABLET | Refills: 1 | Status: SHIPPED | OUTPATIENT
Start: 2023-09-05

## 2023-09-05 NOTE — PROGRESS NOTES
"Chief Complaint   Patient presents with    Diabetes     TYPE I          HPI   Roseann Duncan is a 58 y.o. female had concerns including Diabetes (TYPE I).    She is checking blood sugars 4+ times per day with CGM.  Pump and CGM were not able to be downloaded today due to technical difficulties.  Current medications for diabetes include insulin pump.    Feels like Bgs higher due to stress. Is undergoing house renovation. Living in an  and its tight quarters with family/pets.     Has had diarrhea the last few days.     Highest Bgs tend to be in the morning.   Sleep has been good.     On atorvastatin 20 mg daily.  Tolerating without issue.  Dose was increased after her last labs.    The following portions of the patient's history were reviewed and updated as appropriate: allergies, current medications, past family history, past medical history, past social history, past surgical history, and problem list.      Review of Systems   Constitutional: Negative.    Endocrine:        See HPI      Physical Exam  Vitals reviewed.   Constitutional:       Appearance: Normal appearance. She is obese.      Comments: Body mass index is 27.98 kg/m².   Cardiovascular:      Rate and Rhythm: Normal rate.   Pulmonary:      Effort: Pulmonary effort is normal.   Neurological:      General: No focal deficit present.      Mental Status: She is alert. Mental status is at baseline.   Psychiatric:         Mood and Affect: Mood normal.         Behavior: Behavior normal.      /60 (BP Location: Left arm, Patient Position: Sitting, Cuff Size: Adult)   Pulse 63   Ht 172.7 cm (68\")   Wt 83.5 kg (184 lb)   SpO2 98%   BMI 27.98 kg/m²      Labs and imaging    CMP:  Lab Results   Component Value Date    BUN 14 04/04/2023    CREATININE 0.74 04/04/2023    BCR 18.9 04/04/2023     04/04/2023    K 3.9 04/04/2023    CO2 29.5 (H) 04/04/2023    CALCIUM 9.8 04/04/2023    ALBUMIN 4.4 04/04/2023    BILITOT 0.3 04/04/2023    ALKPHOS 94 04/04/2023    " AST 25 04/04/2023    ALT 18 04/04/2023     Lipid Panel:  Lab Results   Component Value Date    CHOL 193 04/04/2023    TRIG 62 04/04/2023    HDL 96 (H) 04/04/2023    VLDL 12 04/04/2023    LDL 85 04/04/2023     HbA1c:  Lab Results   Component Value Date    HGBA1C 7.9 09/05/2023    HGBA1C 7.1 04/04/2023     Glucose:  Lab Results   Component Value Date    POCGLU 278 (A) 09/05/2023     Microalbumin:  Lab Results   Component Value Date    MALBCRERATIO  04/04/2023      Comment:      Unable to calculate       Assessment and plan  Diagnoses and all orders for this visit:    1. Type 1 diabetes mellitus with hyperglycemia (Primary)  Uncontrolled with hyperglycemia.  A1c up to 7.9.  Complicated by neuropathy.  Predominantly with fasting hyperglycemia.  Modifications were made to the pump as noted below.  Refill on gabapentin was sent to the pharmacy.  Labs are up-to-date from April.  Monofilament up-to-date from October.  Ophtho exam should be updated yearly.  -     POC Glucose, Blood  -     POC Glycosylated Hemoglobin (Hb A1C)  -     Comprehensive Metabolic Panel  -     TSH  -     gabapentin (NEURONTIN) 600 MG tablet; Take 1 tablet by mouth every night at bedtime.  Dispense: 90 tablet; Refill: 1    2. Insulin pump titration  Changed basal rate after 3 AM and 11 AM to 1.0 unit/h.  Change correction after 3 AM to 25 from 30 to administer more insulin for high BG's.    3. Mixed hyperlipidemia  Atorvastatin increased to 20 mg daily after her last labs to target lower LDL.  Check labs today.  -     Lipid Panel         Return in about 4 months (around 1/5/2024) for next scheduled follow up. The patient was instructed to contact the clinic with any interval questions or concerns.    Waleska Albrecht, DO   Endocrinologist    Please note that portions of this note were completed with a voice recognition program.

## 2023-09-06 LAB
ALBUMIN SERPL-MCNC: 4.2 G/DL (ref 3.5–5.2)
ALBUMIN/GLOB SERPL: 1.4 G/DL
ALP SERPL-CCNC: 102 U/L (ref 39–117)
ALT SERPL W P-5'-P-CCNC: 27 U/L (ref 1–33)
ANION GAP SERPL CALCULATED.3IONS-SCNC: 12 MMOL/L (ref 5–15)
AST SERPL-CCNC: 27 U/L (ref 1–32)
BILIRUB SERPL-MCNC: 0.4 MG/DL (ref 0–1.2)
BUN SERPL-MCNC: 16 MG/DL (ref 8–23)
BUN/CREAT SERPL: 21.9 (ref 7–25)
CALCIUM SPEC-SCNC: 9.8 MG/DL (ref 8.6–10.5)
CHLORIDE SERPL-SCNC: 97 MMOL/L (ref 98–107)
CHOLEST SERPL-MCNC: 168 MG/DL (ref 0–200)
CO2 SERPL-SCNC: 29 MMOL/L (ref 22–29)
CREAT SERPL-MCNC: 0.73 MG/DL (ref 0.57–1)
EGFRCR SERPLBLD CKD-EPI 2021: 94.3 ML/MIN/1.73
GLOBULIN UR ELPH-MCNC: 2.9 GM/DL
GLUCOSE SERPL-MCNC: 226 MG/DL (ref 65–99)
HDLC SERPL-MCNC: 82 MG/DL (ref 40–60)
LDLC SERPL CALC-MCNC: 72 MG/DL (ref 0–100)
LDLC/HDLC SERPL: 0.87 {RATIO}
POTASSIUM SERPL-SCNC: 4.1 MMOL/L (ref 3.5–5.2)
PROT SERPL-MCNC: 7.1 G/DL (ref 6–8.5)
SODIUM SERPL-SCNC: 138 MMOL/L (ref 136–145)
TRIGL SERPL-MCNC: 74 MG/DL (ref 0–150)
TSH SERPL DL<=0.05 MIU/L-ACNC: 2.2 UIU/ML (ref 0.27–4.2)
VLDLC SERPL-MCNC: 14 MG/DL (ref 5–40)

## 2023-11-07 DIAGNOSIS — E78.2 MIXED HYPERLIPIDEMIA: ICD-10-CM

## 2023-11-08 DIAGNOSIS — E10.65 TYPE 1 DIABETES MELLITUS WITH HYPERGLYCEMIA: ICD-10-CM

## 2023-11-08 RX ORDER — ATORVASTATIN CALCIUM 20 MG/1
20 TABLET, FILM COATED ORAL DAILY
Qty: 90 TABLET | Refills: 3 | Status: SHIPPED | OUTPATIENT
Start: 2023-11-08

## 2023-11-08 RX ORDER — INSULIN ASPART 100 [IU]/ML
INJECTION, SOLUTION INTRAVENOUS; SUBCUTANEOUS
Qty: 60 ML | Refills: 0 | Status: SHIPPED | OUTPATIENT
Start: 2023-11-08

## 2023-11-08 NOTE — TELEPHONE ENCOUNTER
Rx Refill Note  Requested Prescriptions     Pending Prescriptions Disp Refills    NovoLOG 100 UNIT/ML injection [Pharmacy Med Name: NovoLOG 100 UNIT/ML Subcutaneous Solution] 60 mL 0     Sig: USE WITH INSULIN PUMP DAILY. MAX DAILY DOSE OF 60 UNITS        Last office visit with prescribing clinician: 9/5/2023      Next office visit with prescribing clinician: 2/6/2024       Cristin Rose (Jodi)  11/08/23, 15:11 EST

## 2023-11-08 NOTE — TELEPHONE ENCOUNTER
Rx Refill Note  Requested Prescriptions     Pending Prescriptions Disp Refills    atorvastatin (LIPITOR) 20 MG tablet [Pharmacy Med Name: Atorvastatin Calcium 20 MG Oral Tablet] 90 tablet 0     Sig: Take 1 tablet by mouth once daily      Last office visit with prescribing clinician: 9/5/2023     Next office visit with prescribing clinician: 2/6/2024       Chandan Combs MA  11/08/23, 11:12 EST

## 2023-12-13 ENCOUNTER — TELEPHONE (OUTPATIENT)
Dept: ENDOCRINOLOGY | Facility: CLINIC | Age: 58
End: 2023-12-13
Payer: COMMERCIAL

## 2023-12-20 RX ORDER — PROCHLORPERAZINE 25 MG/1
SUPPOSITORY RECTAL
Qty: 1 EACH | Refills: 0 | Status: SHIPPED | OUTPATIENT
Start: 2023-12-20

## 2023-12-20 NOTE — TELEPHONE ENCOUNTER
Rx Refill Note  Requested Prescriptions     Pending Prescriptions Disp Refills    Continuous Blood Gluc Transmit (Dexcom G6 Transmitter) misc [Pharmacy Med Name: DEXCOM G6 TRANSMIT  MIS] 1 each 0     Sig: CHANGE EVERY 90 DAYS AS DIRECTED      Last office visit with prescribing clinician: 9/5/2023     Next office visit with prescribing clinician: 2/6/2024       Chandan Combs MA  12/20/23, 09:39 EST

## 2023-12-26 RX ORDER — PROCHLORPERAZINE 25 MG/1
SUPPOSITORY RECTAL
Qty: 1 EACH | Refills: 0 | OUTPATIENT
Start: 2023-12-26

## 2024-02-06 ENCOUNTER — OFFICE VISIT (OUTPATIENT)
Dept: ENDOCRINOLOGY | Facility: CLINIC | Age: 59
End: 2024-02-06
Payer: COMMERCIAL

## 2024-02-06 VITALS
HEART RATE: 102 BPM | BODY MASS INDEX: 28.64 KG/M2 | HEIGHT: 68 IN | DIASTOLIC BLOOD PRESSURE: 68 MMHG | WEIGHT: 189 LBS | OXYGEN SATURATION: 98 % | SYSTOLIC BLOOD PRESSURE: 130 MMHG

## 2024-02-06 DIAGNOSIS — E10.65 TYPE 1 DIABETES MELLITUS WITH HYPERGLYCEMIA: Primary | ICD-10-CM

## 2024-02-06 LAB
EXPIRATION DATE: ABNORMAL
EXPIRATION DATE: ABNORMAL
GLUCOSE BLDC GLUCOMTR-MCNC: 218 MG/DL (ref 70–130)
HBA1C MFR BLD: 7.1 % (ref 4.5–5.7)
Lab: ABNORMAL
Lab: ABNORMAL

## 2024-02-06 NOTE — PROGRESS NOTES
Chief Complaint   Patient presents with    Diabetes          HPI   Roseann Duncan is a 58 y.o. female had concerns including Diabetes.    She is checking blood sugars 4+ times per day with CGM. Data reviewed from the last two weeks shows average glucose 156 with SD of 45. In target range 71%, high 24%, very high 2.8%, low 1.7%, very low 0.4%.   Pattern of: postprandial hyperglycemia    Pump data reviewed from 1/24/2024 through 2/6/2024 shows control IQ time active 97%.  Total daily dose of insulin 43.68 units, basal 62% or 27.2 units.    Glucose is high in the office today.  Her glucose was near 100 before she left the house, she had a coffee cake, did not bolus due to concern about hypoglycemia while driving.  She sometimes does not bolus until after eating or sometimes later.  Boluses for carbs entered only 14% of the time for total amount of boluses.  She enters sometimes 0 carbs in a day though tends to eat at least once or twice.    The following portions of the patient's history were reviewed and updated as appropriate: allergies, current medications, past family history, past medical history, past social history, past surgical history, and problem list.      Review of Systems     Physical Exam  Vitals reviewed.   Constitutional:       Appearance: Normal appearance.   Cardiovascular:      Rate and Rhythm: Normal rate.      Pulses:           Dorsalis pedis pulses are 2+ on the right side and 2+ on the left side.   Pulmonary:      Effort: Pulmonary effort is normal.   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   Feet:      Right foot:      Skin integrity: Skin integrity normal.      Toenail Condition: Right toenails are normal.      Left foot:      Skin integrity: Skin integrity normal.      Toenail Condition: Left toenails are normal.      Comments: Diabetic Foot Exam Performed and Monofilament Test Performed    Monofilament 4/4 right, 5/5 left  Right great toe  "amputated  Neurological:      General: No focal deficit present.      Mental Status: She is alert. Mental status is at baseline.   Psychiatric:         Mood and Affect: Mood normal.         Behavior: Behavior normal.        /68   Pulse 102   Ht 172.7 cm (68\")   Wt 85.7 kg (189 lb)   SpO2 98%   BMI 28.74 kg/m²      Labs and imaging    CMP:  Lab Results   Component Value Date    BUN 16 09/05/2023    CREATININE 0.73 09/05/2023    EGFR 94.3 09/05/2023    BCR 21.9 09/05/2023     09/05/2023    K 4.1 09/05/2023    CO2 29.0 09/05/2023    CALCIUM 9.8 09/05/2023    ALBUMIN 4.2 09/05/2023    BILITOT 0.4 09/05/2023    ALKPHOS 102 09/05/2023    AST 27 09/05/2023    ALT 27 09/05/2023     Lipid Panel:  Lab Results   Component Value Date    CHOL 168 09/05/2023    TRIG 74 09/05/2023    HDL 82 (H) 09/05/2023    VLDL 14 09/05/2023    LDL 72 09/05/2023     HbA1c:  Lab Results   Component Value Date    HGBA1C 7.1 (A) 02/06/2024    HGBA1C 7.9 09/05/2023     Glucose:  Lab Results   Component Value Date    POCGLU 218 (A) 02/06/2024     Microalbumin:  Lab Results   Component Value Date    MALBCRERATIO  04/04/2023      Comment:      Unable to calculate     TSH:  Lab Results   Component Value Date    TSH 2.200 09/05/2023       Assessment and plan  Diagnoses and all orders for this visit:    1. Type 1 diabetes mellitus with hyperglycemia (Primary)  Uncontrolled with postprandial hyperglycemia.  Sometimes due to delayed bolus or not bolusing at all for carbs.  Encouraged her to try to bolus accurate carb counts 15 minutes prior to eating.  A1c improved to 7.1.  Complicated by neuropathy status post amputation.  No changes will be made to the pump today.  Labs are up-to-date from April including normal urine microalbumin.  Ophtho exam is up-to-date with cataract surgery recently.  Requested the report be sent here. Monofilament was updated in the office today.  -     POC Glucose, Blood  -     POC Glycosylated Hemoglobin (Hb " A1C)         Return in about 4 months (around 6/6/2024) for next scheduled follow up. The patient was instructed to contact the clinic with any interval questions or concerns.    Waleska Albrecht, DO   Endocrinologist    Please note that portions of this note were completed with a voice recognition program.

## 2024-02-27 DIAGNOSIS — E10.65 TYPE 1 DIABETES MELLITUS WITH HYPERGLYCEMIA: ICD-10-CM

## 2024-02-27 NOTE — TELEPHONE ENCOUNTER
Rx Refill Note  Requested Prescriptions     Pending Prescriptions Disp Refills    gabapentin (NEURONTIN) 600 MG tablet [Pharmacy Med Name: Gabapentin 600 MG Oral Tablet] 90 tablet 0     Sig: TAKE 1 TABLET BY MOUTH EVERY DAY AT BEDTIME    NovoLOG 100 UNIT/ML injection [Pharmacy Med Name: NovoLOG 100 UNIT/ML Subcutaneous Solution] 60 mL 0     Sig: USE WITH INSULIN PUMP DAILY. MAX DAILY DOSE OF 60 UNITS      Last office visit with prescribing clinician: 2/6/2024     Next office visit with prescribing clinician: 7/9/2024       Chandan Combs MA  02/27/24, 14:13 EST

## 2024-02-28 RX ORDER — GABAPENTIN 600 MG/1
600 TABLET ORAL
Qty: 90 TABLET | Refills: 1 | Status: SHIPPED | OUTPATIENT
Start: 2024-02-28

## 2024-02-28 RX ORDER — INSULIN ASPART 100 [IU]/ML
INJECTION, SOLUTION INTRAVENOUS; SUBCUTANEOUS
Qty: 60 ML | Refills: 1 | Status: SHIPPED | OUTPATIENT
Start: 2024-02-28

## 2024-03-20 RX ORDER — PROCHLORPERAZINE 25 MG/1
SUPPOSITORY RECTAL
Qty: 1 EACH | Refills: 3 | Status: SHIPPED | OUTPATIENT
Start: 2024-03-20

## 2024-06-13 RX ORDER — PROCHLORPERAZINE 25 MG/1
SUPPOSITORY RECTAL
Qty: 27 EACH | Refills: 0 | Status: SHIPPED | OUTPATIENT
Start: 2024-06-13

## 2024-06-13 NOTE — TELEPHONE ENCOUNTER
Rx Refill Note  Requested Prescriptions     Pending Prescriptions Disp Refills    Continuous Glucose Sensor (Dexcom G6 Sensor) [Pharmacy Med Name: DEXCOM G6 SENSOR    MIS] 27 each 0     Sig: APPLY AND CHANGE SENSOR EVERY 10 DAYS AS DIRECTED          Last office visit with prescribing clinician: 2/6/2024     Next office visit with prescribing clinician: 7/9/2024         Daysi Hartley MA  06/13/24, 08:52 EDT

## 2024-07-31 ENCOUNTER — TELEPHONE (OUTPATIENT)
Dept: ENDOCRINOLOGY | Facility: CLINIC | Age: 59
End: 2024-07-31

## 2024-07-31 NOTE — TELEPHONE ENCOUNTER
Spoke with patient.  Advised that I was not aware of any patient assistance programs for CGM.  Did advise patient to contact Dexcom.  She is going to check with them to see if they have anything available.

## 2024-07-31 NOTE — TELEPHONE ENCOUNTER
PATIENT WOULD LIKE TO KNOW IF WE CAN HELP HER FIND RESOURCES TO HELP WITH MEDICATION. SHE IS HAVING DIFFICULTY FINDING THE Continuous Blood Gluc Transmit (Dexcom G6 Transmitter) misc.       PLEASE ADVISE

## 2024-10-14 DIAGNOSIS — E10.65 TYPE 1 DIABETES MELLITUS WITH HYPERGLYCEMIA: ICD-10-CM

## 2024-10-15 RX ORDER — INSULIN ASPART 100 [IU]/ML
INJECTION, SOLUTION INTRAVENOUS; SUBCUTANEOUS
Qty: 20 ML | Refills: 1 | Status: SHIPPED | OUTPATIENT
Start: 2024-10-15

## 2024-10-15 NOTE — TELEPHONE ENCOUNTER
Rx Refill Note  Requested Prescriptions     Pending Prescriptions Disp Refills    NovoLOG 100 UNIT/ML injection 60 mL 1     Sig: USE WITH INSULIN PUMP DAILY. MAX DAILY DOSE OF 60 UNITS        Last office visit with prescribing clinician: 2/6/2024      Next office visit with prescribing clinician: Visit date not found       Cristin Rose (Jodi)  10/15/24, 10:43 EDT   Noted needs an appt for refill

## 2024-10-21 NOTE — TELEPHONE ENCOUNTER
Patient called office, stated that she was unable to  her novolog we called in due to being to expensive. She is needing the generic called in to the [facility] in [place].

## 2025-01-17 ENCOUNTER — TELEPHONE (OUTPATIENT)
Dept: ENDOCRINOLOGY | Facility: CLINIC | Age: 60
End: 2025-01-17

## 2025-01-17 DIAGNOSIS — E10.65 TYPE 1 DIABETES MELLITUS WITH HYPERGLYCEMIA: ICD-10-CM

## 2025-01-17 RX ORDER — INSULIN ASPART 100 [IU]/ML
INJECTION, SOLUTION INTRAVENOUS; SUBCUTANEOUS
Qty: 20 ML | Refills: 0 | Status: SHIPPED | OUTPATIENT
Start: 2025-01-17

## 2025-01-17 NOTE — TELEPHONE ENCOUNTER
Caller: Roseann Duncan    Relationship: Self    Best call back number:   Telephone Information:   Mobile [phone]     Requested Prescriptions:   Requested Prescriptions     Pending Prescriptions Disp Refills    Insulin Aspart (NovoLOG) 100 UNIT/ML injection 20 mL 1     Sig: USE WITH INSULIN PUMP DAILY. MAX DAILY DOSE OF 60 UNITS needs an appointment for refills      Pharmacy where request should be sent:  [facility] [address] [place] [address] [phone] [facility] [phone] FX     Last office visit with prescribing clinician: 2/6/2024   Last telemedicine visit with prescribing clinician: Visit date not found   Next office visit with prescribing clinician: Visit date not found     Does the patient have less than a 3 day supply:  [x] Yes  [] No    Would you like a call back once the refill request has been completed: [x] Yes [] No    If the office needs to give you a call back, can they leave a voicemail: [x] Yes [] No    Monica Gonsalez Rep   01/17/25 11:50 EST

## 2025-02-20 ENCOUNTER — OFFICE VISIT (OUTPATIENT)
Dept: ENDOCRINOLOGY | Facility: CLINIC | Age: 60
End: 2025-02-20
Payer: COMMERCIAL

## 2025-02-20 VITALS
WEIGHT: 190.2 LBS | HEIGHT: 68 IN | OXYGEN SATURATION: 98 % | BODY MASS INDEX: 28.82 KG/M2 | DIASTOLIC BLOOD PRESSURE: 60 MMHG | SYSTOLIC BLOOD PRESSURE: 102 MMHG | HEART RATE: 60 BPM

## 2025-02-20 DIAGNOSIS — E10.65 TYPE 1 DIABETES MELLITUS WITH HYPERGLYCEMIA: Primary | ICD-10-CM

## 2025-02-20 DIAGNOSIS — Z46.81 INSULIN PUMP TITRATION: ICD-10-CM

## 2025-02-20 LAB
EXPIRATION DATE: ABNORMAL
EXPIRATION DATE: ABNORMAL
GLUCOSE BLDC GLUCOMTR-MCNC: 205 MG/DL (ref 70–130)
HBA1C MFR BLD: 7.2 % (ref 4.5–5.7)
Lab: ABNORMAL
Lab: ABNORMAL

## 2025-02-20 RX ORDER — INSULIN ASPART 100 [IU]/ML
INJECTION, SOLUTION INTRAVENOUS; SUBCUTANEOUS
Qty: 20 ML | Refills: 5 | Status: SHIPPED | OUTPATIENT
Start: 2025-02-20

## 2025-02-20 NOTE — PROGRESS NOTES
"     Office Note      Date: 2025  Patient Name: Roseann Duncan  MRN: [id number]  : 1965    Chief Complaint   Patient presents with    Diabetes     Type I       History of Present Illness:   Roseann Duncan is a 59 y.o. female who presents for follow-up for type 1 diabetes.  It has been over a year since her last appointment.  She typically sees Dr. Waleska Albrecht.  She remains on the tandem insulin pump with Dexcom G6 continuous glucose monitor.  She reports overall things seem to be going well.  Overall her blood sugars have been pretty good and she denies any severe or frequent hypoglycemia.  She reports she is due for her eye exam and will be getting this scheduled.  She reports she had labs completed last month with her primary care physician.  She denies any trouble with her feet today.  Dr. Albrecht did her foot exam last year.  She does have neuropathy.      Subjective     Review of Systems:   Review of Systems   Constitutional: Negative.    Cardiovascular: Negative.    Gastrointestinal: Negative.    Endocrine: Negative.    Neurological:  Positive for tremors.       The following portions of the patient's history were reviewed and updated as appropriate: allergies, current medications, past family history, past medical history, past social history, past surgical history, and problem list.    Objective     Vitals:    25 1301   BP: 102/60   BP Location: Left arm   Patient Position: Sitting   Cuff Size: Adult   Pulse: 60   SpO2: 98%   Weight: 86.3 kg (190 lb 3.2 oz)   Height: 172.7 cm (67.99\")     Body mass index is 28.93 kg/m².    Physical Exam  Vitals reviewed.   Constitutional:       General: She is not in acute distress.     Appearance: Normal appearance.   Cardiovascular:      Pulses:           Dorsalis pedis pulses are 2+ on the right side and 2+ on the left side.        Posterior tibial pulses are 2+ on the right side and 2+ on the left side.   Musculoskeletal:      Right foot: Deformity " present.      Left foot: Deformity present.      Right Lower Extremity: Right leg is amputated below ankle. (Right great toe amputated)  Feet:      Right foot:      Protective Sensation: 10 sites tested.  0 sites sensed.      Skin integrity: Skin integrity normal.      Toenail Condition: Right toenails are normal.      Left foot:      Protective Sensation: 10 sites tested.  0 sites sensed.      Skin integrity: Skin integrity normal.      Toenail Condition: Left toenails are normal.      Comments: Hammertoes right great toe amputated  Diabetic Foot Exam Performed and Monofilament Test Performed      Neurological:      Mental Status: She is alert.         HEMOGLOBIN A1C  Lab Results   Component Value Date    HGBA1C 7.2 (A) 02/20/2025       GLUCOSE  Glucose   Date Value Ref Range Status   02/20/2025 205 (A) 70 - 130 mg/dL Final       CMP  Lab Results   Component Value Date    GLUCOSE 226 (H) 09/05/2023    BUN 16 09/05/2023    CREATININE 0.73 09/05/2023    BCR 21.9 09/05/2023    K 4.1 09/05/2023    CO2 29.0 09/05/2023    CALCIUM 9.8 09/05/2023    AST 27 09/05/2023    ALT 27 09/05/2023       LIPID PANEL  Lab Results   Component Value Date    CHOL 168 09/05/2023    TRIG 74 09/05/2023    HDL 82 (H) 09/05/2023    LDL 72 09/05/2023       URINE MICROALBUMIN/CREATININE RATIO  Microalbumin/Creatinine Ratio   Date Value Ref Range Status   04/04/2023   Final     Comment:     Unable to calculate       TSH  Lab Results   Component Value Date    TSH 2.200 09/05/2023       Assessment / Plan      Assessment & Plan:  1. Type 1 diabetes mellitus with hyperglycemia  Her hemoglobin A1c is up slightly as compared to last year at 7.2%.  I reviewed her tandem insulin pump download.  For the last 2 weeks her average glucose is 151 mg/dL with 78% of her readings within range, 18% high, 1.5% very high, 1.7% low and 0.8% very low.  We did not make any changes to her pump settings today.  I refilled her insulin today.  Her weight is stable.  I  encouraged healthy eating habits and physical activity as tolerated.  Her blood pressure is excellent today.  She had recent fasting labs completed with her primary care physician.  We will try to get a copy of these results.  She brought forms with her today from the [place] transportation cabinet to be filled out by Dr. Albrecht.  - POC Glucose, Blood  - POC Glycosylated Hemoglobin (Hb A1C)  - Insulin Aspart (NovoLOG) 100 UNIT/ML injection; USE WITH INSULIN PUMP DAILY. MAX DAILY DOSE OF 60 UNITS needs an appointment for refills  Dispense: 20 mL; Refill: 5          Return in about 4 months (around 6/20/2025) for Recheck-- Sees Dr. Waleska Albrecht.     This note was dictated using Dragon voice recognition.    Electronically signed by: FAIZAN Qiu  02/20/2025

## 2025-02-27 ENCOUNTER — TELEPHONE (OUTPATIENT)
Dept: ENDOCRINOLOGY | Facility: CLINIC | Age: 60
End: 2025-02-27

## 2025-02-27 ENCOUNTER — TELEPHONE (OUTPATIENT)
Dept: ENDOCRINOLOGY | Facility: CLINIC | Age: 60
End: 2025-02-27
Payer: COMMERCIAL

## 2025-02-27 NOTE — TELEPHONE ENCOUNTER
PT CALLED STATING HER PCP PRESCRIBED GABAPENTIN PREVIOUSLY BUT SHE IS UNABLE TO GET A HOLD OF THEM. SHE IS OUT OF MEDICATION AND REQUESTED WE SEND IN RX. PT REQUESTED A CALL BACK.

## 2025-02-27 NOTE — TELEPHONE ENCOUNTER
Need updated CSA on file please. As this is a controlled substance, we must have that updated form on file. If transitioning to our office to fill, she must be seen at minimum every 6 months for refills. Is due for follow-up around June per Krystina's last note. Please schedule

## 2025-02-28 NOTE — TELEPHONE ENCOUNTER
PT CALLED STATING SHE WAS SEEN 02/20/25 BY BERTHA. SHE REQUESTED A CALL BACK ABOUT HOW TO GO ABOUT FILLING OUT CSA FORM.

## 2025-02-28 NOTE — TELEPHONE ENCOUNTER
Patient notified.  She states she is unable to come in and sign a CSA.  Also advised to schedule follow up in June.  Patient states she will work on that.

## 2025-03-03 DIAGNOSIS — E10.65 TYPE 1 DIABETES MELLITUS WITH HYPERGLYCEMIA: ICD-10-CM

## 2025-03-03 RX ORDER — GABAPENTIN 600 MG/1
600 TABLET ORAL
Qty: 90 TABLET | Refills: 1 | Status: SHIPPED | OUTPATIENT
Start: 2025-03-03

## 2025-04-23 RX ORDER — PROCHLORPERAZINE 25 MG/1
SUPPOSITORY RECTAL
Qty: 6 EACH | Refills: 0 | Status: SHIPPED | OUTPATIENT
Start: 2025-04-23

## 2025-06-06 RX ORDER — PROCHLORPERAZINE 25 MG/1
SUPPOSITORY RECTAL
Qty: 9 EACH | Refills: 3 | Status: SHIPPED | OUTPATIENT
Start: 2025-06-06

## 2025-06-06 NOTE — TELEPHONE ENCOUNTER
Rx Refill Note  Requested Prescriptions     Signed Prescriptions Disp Refills    Continuous Glucose Sensor (Dexcom G6 Sensor) 9 each 3     Sig: APPLY AND CHANGE SENSOR EVERY 10 DAYS AS DIRECTED     Authorizing Provider: TAVARES ZACARIAS     Ordering User: JAIMEE PACHECO      Last office visit with prescribing clinician: 2/6/2024     Next office visit with prescribing clinician: 6/26/2025       Jaimee Pacheco  06/06/25, 10:09 EDT

## 2025-06-26 RX ORDER — INSULIN PUMP CARTRIDGE
1 CARTRIDGE (EA) SUBCUTANEOUS SEE ADMIN INSTRUCTIONS
Qty: 30 EACH | Refills: 3 | Status: SHIPPED | OUTPATIENT
Start: 2025-06-26

## 2025-06-26 RX ORDER — INSULIN INFUSION SET/CARTRIDGE
1 COMBINATION PACKAGE (EA) MISCELLANEOUS SEE ADMIN INSTRUCTIONS
Qty: 30 EACH | Refills: 3 | Status: SHIPPED | OUTPATIENT
Start: 2025-06-26

## 2025-06-26 NOTE — TELEPHONE ENCOUNTER
PATIENT IS GOING TO BE COVERED BY MEDICARE STARTING IN JULY. SHE IS GOING TO NEED A WRITTEN PRESCRIPTION TO GET HER TANDEM PUMP SUPPLIES AS SHE WILL NEED TO TAKE THE WRITTEN PRESCRIPTION TO [facility]. SHE WOULD LIKE FOR US TO MAIL HER THE WRITTEN ORDERS FOR TANDEM PUMP SUPPLIES. SHE WILL NEED TANDEM T-SLIM X2 3MML CARTRIDGE WITH T-LOCK BOX OF 10 AND TANDEM AUTO SOFT XC INFUSION SET  6 MM 23 INCH BOX OF 10. PATIENTS NUMBER [phone]

## 2025-06-30 RX ORDER — PROCHLORPERAZINE 25 MG/1
1 SUPPOSITORY RECTAL
Qty: 1 EACH | Refills: 3 | Status: SHIPPED | OUTPATIENT
Start: 2025-06-30

## 2025-06-30 RX ORDER — PROCHLORPERAZINE 25 MG/1
SUPPOSITORY RECTAL
Qty: 1 EACH | Refills: 0 | OUTPATIENT
Start: 2025-06-30

## 2025-06-30 NOTE — TELEPHONE ENCOUNTER
Patient called office, stated that she is needing a refill on her Dexcom G6 transmitter. She stated her sugars have been in the 300-500s the last 3-4 days. She would like this sent to [facility] pharmacy in [place].

## 2025-07-01 ENCOUNTER — TELEPHONE (OUTPATIENT)
Dept: ENDOCRINOLOGY | Facility: CLINIC | Age: 60
End: 2025-07-01
Payer: COMMERCIAL

## 2025-07-01 DIAGNOSIS — E10.65 TYPE 1 DIABETES MELLITUS WITH HYPERGLYCEMIA: Primary | ICD-10-CM

## 2025-07-01 NOTE — TELEPHONE ENCOUNTER
FYI- Received paperwork request from [employer].  They are requesting an office note within the last 90 days and a C-pep with fasting glucose.  I did not see one in chart.  Has upcoming appointment scheduled for 07/10/2025.  Can you order at that appointment?

## 2025-07-10 ENCOUNTER — OFFICE VISIT (OUTPATIENT)
Dept: ENDOCRINOLOGY | Facility: CLINIC | Age: 60
End: 2025-07-10
Payer: MEDICARE

## 2025-07-10 VITALS
HEIGHT: 68 IN | BODY MASS INDEX: 28.31 KG/M2 | SYSTOLIC BLOOD PRESSURE: 104 MMHG | OXYGEN SATURATION: 95 % | DIASTOLIC BLOOD PRESSURE: 62 MMHG | WEIGHT: 186.8 LBS | HEART RATE: 63 BPM

## 2025-07-10 DIAGNOSIS — E78.2 MIXED HYPERLIPIDEMIA: ICD-10-CM

## 2025-07-10 DIAGNOSIS — E10.65 TYPE 1 DIABETES MELLITUS WITH HYPERGLYCEMIA: Primary | ICD-10-CM

## 2025-07-10 LAB
EXPIRATION DATE: ABNORMAL
EXPIRATION DATE: ABNORMAL
GLUCOSE BLDC GLUCOMTR-MCNC: 180 MG/DL (ref 70–130)
HBA1C MFR BLD: 7.7 % (ref 4.5–5.7)
Lab: ABNORMAL
Lab: ABNORMAL

## 2025-07-10 PROCEDURE — 80061 LIPID PANEL: CPT | Performed by: INTERNAL MEDICINE

## 2025-07-10 PROCEDURE — 84681 ASSAY OF C-PEPTIDE: CPT | Performed by: INTERNAL MEDICINE

## 2025-07-10 PROCEDURE — 84443 ASSAY THYROID STIM HORMONE: CPT | Performed by: INTERNAL MEDICINE

## 2025-07-10 PROCEDURE — 82043 UR ALBUMIN QUANTITATIVE: CPT | Performed by: INTERNAL MEDICINE

## 2025-07-10 PROCEDURE — 80053 COMPREHEN METABOLIC PANEL: CPT | Performed by: INTERNAL MEDICINE

## 2025-07-10 PROCEDURE — 82570 ASSAY OF URINE CREATININE: CPT | Performed by: INTERNAL MEDICINE

## 2025-07-10 RX ORDER — INSULIN ASPART 100 [IU]/ML
INJECTION, SOLUTION INTRAVENOUS; SUBCUTANEOUS
Qty: 90 ML | Refills: 3 | Status: SHIPPED | OUTPATIENT
Start: 2025-07-10

## 2025-07-10 NOTE — ASSESSMENT & PLAN NOTE
Uncontrolled with recent hyperglycemia. A1c up to 7.7. Bgs better this past week.   Diagnosed age 10.   Complicated by neuropathy with amputation.   No changes to pump today. If Bgs high again, advised to reach out to the office for more conservative adjustments to basal rates.  Adjusted max dose of insulin and renewed prescription.   Prescriptions were updated.   Upgrade to dexcom 7 - pt getting through DME.   Labs today. MF updated 2/2025. Ophtho exam is UTD. Request report.

## 2025-07-10 NOTE — PROGRESS NOTES
"Chief Complaint   Patient presents with    Diabetes     Type I Diabetes Mellitus with Hyperglycemia          HPI   Roseann Duncan is a 59 y.o. female had concerns including Diabetes (Type I Diabetes Mellitus with Hyperglycemia).    She is checking blood sugars 4+ times per day with dexcom CGM. Data reviewed from the last two weeks shows average glucose 193 with SD of 69. In target range 46%, high 31%, very high 21%, low 1.5%, very low 0.4%.   Pattern of: hyperglycemia recently but improved in the last 4 days after pump adjustments.     Current medications for diabetes include tandem insulin pump.  Data reviewed from 6/27/25-7/10/25 with time in control IQ 92%. Total daily dose 76.76 units.   Basal 51% or 39.15 units, bolus 37.6 units.     She eats low carb some days.     On atorvastatin 20 mg daily.     The following portions of the patient's history were reviewed and updated as appropriate: allergies, current medications, past family history, past medical history, past social history, past surgical history, and problem list.      Review of Systems   Constitutional: Negative.    Endocrine:        See HPI        Physical Exam  Vitals reviewed.   Constitutional:       Appearance: Normal appearance.   Cardiovascular:      Rate and Rhythm: Normal rate.   Pulmonary:      Effort: Pulmonary effort is normal.   Neurological:      General: No focal deficit present.      Mental Status: She is alert. Mental status is at baseline.   Psychiatric:         Mood and Affect: Mood normal.         Behavior: Behavior normal.        /62 (BP Location: Left arm, Patient Position: Sitting, Cuff Size: Adult)   Pulse 63   Ht 172.7 cm (67.99\")   Wt 84.7 kg (186 lb 12.8 oz)   SpO2 95%   BMI 28.41 kg/m²      Labs and imaging    A1C:  Lab Results   Component Value Date    HGBA1C 7.7 (A) 07/10/2025    HGBA1C 7.2 (A) 02/20/2025      Glucose:  Lab Results   Component Value Date    POCGLU 180 (A) 07/10/2025      CMP:  Lab Results   Component " Value Date    GLUCOSE 226 (H) 09/05/2023    BUN 16 09/05/2023    CREATININE 0.73 09/05/2023     09/05/2023    K 4.1 09/05/2023    CL 97 (L) 09/05/2023    CALCIUM 9.8 09/05/2023    PROTEINTOT 7.1 09/05/2023    ALBUMIN 4.2 09/05/2023    ALT 27 09/05/2023    AST 27 09/05/2023    ALKPHOS 102 09/05/2023    BILITOT 0.4 09/05/2023    GLOB 2.9 09/05/2023    AGRATIO 1.4 09/05/2023    BCR 21.9 09/05/2023    ANIONGAP 12.0 09/05/2023    EGFR 94.3 09/05/2023      Lipid Panel:  Lab Results   Component Value Date    CHOL 168 09/05/2023    TRIG 74 09/05/2023    HDL 82 (H) 09/05/2023    LDL 72 09/05/2023      Urine Microalbumin:  Lab Results   Component Value Date    MALBCRERATIO  04/04/2023      Comment:      Unable to calculate      TSH:  Lab Results   Component Value Date    TSH 2.200 09/05/2023        Assessment and plan  Diagnoses and all orders for this visit:    1. Type 1 diabetes mellitus with hyperglycemia (Primary)  Assessment & Plan:  Uncontrolled with recent hyperglycemia. A1c up to 7.7. Bgs better this past week.   Diagnosed age 10.   Complicated by neuropathy with amputation.   No changes to pump today. If Bgs high again, advised to reach out to the office for more conservative adjustments to basal rates.  Adjusted max dose of insulin and renewed prescription.   Prescriptions were updated.   Upgrade to dexcom 7 - pt getting through DME.   Labs today. MF updated 2/2025. Ophtho exam is UTD. Request report.     Orders:  -     POC Glucose, Blood  -     POC Glycosylated Hemoglobin (Hb A1C)  -     Comprehensive Metabolic Panel  -     Lipid Panel  -     Microalbumin / Creatinine Urine Ratio - Urine, Clean Catch  -     TSH  -     C-Peptide  -     Insulin Aspart (NovoLOG) 100 UNIT/ML injection; USE WITH INSULIN PUMP DAILY. MAX DAILY DOSE  UNITS  Dispense: 90 mL; Refill: 3    2. Mixed hyperlipidemia  Assessment & Plan:   On atorvastatin. Labs due. Check today.           Return in about 3 months (around 10/10/2025)  for next scheduled follow up. The patient was instructed to contact the clinic with any interval questions or concerns.    Electronically signed by: Waleska Albrecht DO   Endocrinologist    Please note that portions of this note were completed with a voice recognition program.

## 2025-07-10 NOTE — PATIENT INSTRUCTIONS
Patients with diabetes should have a yearly eye exam. Please be sure to schedule this at least once yearly and have the eye exam report faxed to [phone].

## 2025-07-11 ENCOUNTER — TELEPHONE (OUTPATIENT)
Dept: ENDOCRINOLOGY | Facility: CLINIC | Age: 60
End: 2025-07-11
Payer: MEDICARE

## 2025-07-11 LAB
ALBUMIN SERPL-MCNC: 4 G/DL (ref 3.5–5.2)
ALBUMIN UR-MCNC: <1.2 MG/DL
ALBUMIN/GLOB SERPL: 1.2 G/DL
ALP SERPL-CCNC: 109 U/L (ref 39–117)
ALT SERPL W P-5'-P-CCNC: 13 U/L (ref 1–33)
ANION GAP SERPL CALCULATED.3IONS-SCNC: 9.4 MMOL/L (ref 5–15)
AST SERPL-CCNC: 23 U/L (ref 1–32)
BILIRUB SERPL-MCNC: 0.4 MG/DL (ref 0–1.2)
BUN SERPL-MCNC: 20 MG/DL (ref 8–23)
BUN/CREAT SERPL: 26 (ref 7–25)
CALCIUM SPEC-SCNC: 9.8 MG/DL (ref 8.6–10.5)
CHLORIDE SERPL-SCNC: 99 MMOL/L (ref 98–107)
CHOLEST SERPL-MCNC: 184 MG/DL (ref 0–200)
CO2 SERPL-SCNC: 28.6 MMOL/L (ref 22–29)
CREAT SERPL-MCNC: 0.77 MG/DL (ref 0.57–1)
CREAT UR-MCNC: 168.6 MG/DL
EGFRCR SERPLBLD CKD-EPI 2021: 87.9 ML/MIN/1.73
GLOBULIN UR ELPH-MCNC: 3.3 GM/DL
GLUCOSE SERPL-MCNC: 154 MG/DL (ref 65–99)
HDLC SERPL-MCNC: 89 MG/DL (ref 40–60)
LDLC SERPL CALC-MCNC: 79 MG/DL (ref 0–100)
LDLC/HDLC SERPL: 0.87 {RATIO}
MICROALBUMIN/CREAT UR: NORMAL MG/G{CREAT}
POTASSIUM SERPL-SCNC: 4.3 MMOL/L (ref 3.5–5.2)
PROT SERPL-MCNC: 7.3 G/DL (ref 6–8.5)
SODIUM SERPL-SCNC: 137 MMOL/L (ref 136–145)
TRIGL SERPL-MCNC: 90 MG/DL (ref 0–150)
TSH SERPL DL<=0.05 MIU/L-ACNC: 3.43 UIU/ML (ref 0.27–4.2)
VLDLC SERPL-MCNC: 16 MG/DL (ref 5–40)

## 2025-07-11 RX ORDER — INSULIN PUMP CARTRIDGE
1 CARTRIDGE (EA) SUBCUTANEOUS SEE ADMIN INSTRUCTIONS
Qty: 30 EACH | Refills: 3 | Status: SHIPPED | OUTPATIENT
Start: 2025-07-11

## 2025-07-11 RX ORDER — INSULIN INFUSION SET/CARTRIDGE
1 COMBINATION PACKAGE (EA) MISCELLANEOUS SEE ADMIN INSTRUCTIONS
Qty: 30 EACH | Refills: 3 | Status: SHIPPED | OUTPATIENT
Start: 2025-07-11

## 2025-07-11 RX ORDER — INSULIN PUMP CARTRIDGE
1 CARTRIDGE (EA) SUBCUTANEOUS SEE ADMIN INSTRUCTIONS
Qty: 30 EACH | Refills: 3 | Status: SHIPPED | OUTPATIENT
Start: 2025-07-11 | End: 2025-07-11 | Stop reason: SDUPTHER

## 2025-07-11 RX ORDER — INSULIN INFUSION SET/CARTRIDGE
1 COMBINATION PACKAGE (EA) MISCELLANEOUS SEE ADMIN INSTRUCTIONS
Qty: 30 EACH | Refills: 3 | Status: SHIPPED | OUTPATIENT
Start: 2025-07-11 | End: 2025-07-11 | Stop reason: SDUPTHER

## 2025-07-11 NOTE — TELEPHONE ENCOUNTER
Pt is needing the date changed on the scripts that was sent because the date is before 7/1/2025 .  Please fax to [phone]

## 2025-07-12 LAB — C PEPTIDE SERPL-MCNC: <0.1 NG/ML (ref 1.1–4.4)
